# Patient Record
Sex: FEMALE | Race: WHITE | NOT HISPANIC OR LATINO | Employment: FULL TIME | URBAN - METROPOLITAN AREA
[De-identification: names, ages, dates, MRNs, and addresses within clinical notes are randomized per-mention and may not be internally consistent; named-entity substitution may affect disease eponyms.]

---

## 2023-11-21 DIAGNOSIS — E03.9 HYPOTHYROIDISM, UNSPECIFIED TYPE: ICD-10-CM

## 2023-11-21 RX ORDER — LEVOTHYROXINE SODIUM 25 UG/1
25 TABLET ORAL DAILY
Qty: 90 TABLET | Refills: 0 | Status: SHIPPED | OUTPATIENT
Start: 2023-11-21 | End: 2024-01-23 | Stop reason: SDUPTHER

## 2023-11-21 RX ORDER — LEVOTHYROXINE SODIUM 25 UG/1
25 TABLET ORAL DAILY
COMMUNITY
End: 2023-11-21 | Stop reason: SDUPTHER

## 2023-11-21 NOTE — TELEPHONE ENCOUNTER
Spoke w/patient, needs refill on  Levoxyl 25mg- out of pills  Send to [facility] [address]  Last appt. 2/7/23  Upcoming appt. 2/14/24

## 2024-01-23 ENCOUNTER — OFFICE VISIT (OUTPATIENT)
Dept: PRIMARY CARE | Facility: CLINIC | Age: 24
End: 2024-01-23
Payer: COMMERCIAL

## 2024-01-23 ENCOUNTER — LAB (OUTPATIENT)
Dept: LAB | Facility: LAB | Age: 24
End: 2024-01-23
Payer: COMMERCIAL

## 2024-01-23 VITALS
TEMPERATURE: 98.2 F | SYSTOLIC BLOOD PRESSURE: 104 MMHG | DIASTOLIC BLOOD PRESSURE: 68 MMHG | HEIGHT: 63 IN | HEART RATE: 93 BPM | BODY MASS INDEX: 29.77 KG/M2 | OXYGEN SATURATION: 97 % | WEIGHT: 168 LBS

## 2024-01-23 DIAGNOSIS — F41.9 ANXIETY: ICD-10-CM

## 2024-01-23 DIAGNOSIS — T78.1XXA GASTROINTESTINAL FOOD SENSITIVITY: Primary | ICD-10-CM

## 2024-01-23 DIAGNOSIS — E03.9 HYPOTHYROIDISM, UNSPECIFIED TYPE: ICD-10-CM

## 2024-01-23 DIAGNOSIS — T78.1XXA GASTROINTESTINAL FOOD SENSITIVITY: ICD-10-CM

## 2024-01-23 PROCEDURE — 84481 FREE ASSAY (FT-3): CPT

## 2024-01-23 PROCEDURE — 86003 ALLG SPEC IGE CRUDE XTRC EA: CPT

## 2024-01-23 PROCEDURE — 99214 OFFICE O/P EST MOD 30 MIN: CPT | Performed by: INTERNAL MEDICINE

## 2024-01-23 PROCEDURE — 36415 COLL VENOUS BLD VENIPUNCTURE: CPT

## 2024-01-23 PROCEDURE — 84443 ASSAY THYROID STIM HORMONE: CPT

## 2024-01-23 PROCEDURE — 1036F TOBACCO NON-USER: CPT | Performed by: INTERNAL MEDICINE

## 2024-01-23 PROCEDURE — 84439 ASSAY OF FREE THYROXINE: CPT

## 2024-01-23 PROCEDURE — 81383 HLA II TYPING 1 ALLELE HR: CPT

## 2024-01-23 RX ORDER — SERTRALINE HYDROCHLORIDE 50 MG/1
50 TABLET, FILM COATED ORAL DAILY
Qty: 30 TABLET | Refills: 1 | Status: SHIPPED | OUTPATIENT
Start: 2024-01-23 | End: 2024-02-28 | Stop reason: SDUPTHER

## 2024-01-23 RX ORDER — LEVOTHYROXINE SODIUM 25 UG/1
25 TABLET ORAL DAILY
Qty: 90 TABLET | Refills: 0 | Status: SHIPPED | OUTPATIENT
Start: 2024-01-23 | End: 2024-01-25 | Stop reason: DRUGHIGH

## 2024-01-23 ASSESSMENT — PATIENT HEALTH QUESTIONNAIRE - PHQ9
2. FEELING DOWN, DEPRESSED OR HOPELESS: NOT AT ALL
1. LITTLE INTEREST OR PLEASURE IN DOING THINGS: NOT AT ALL
SUM OF ALL RESPONSES TO PHQ9 QUESTIONS 1 AND 2: 0

## 2024-01-23 ASSESSMENT — ENCOUNTER SYMPTOMS
LOSS OF SENSATION IN FEET: 0
DEPRESSION: 0
OCCASIONAL FEELINGS OF UNSTEADINESS: 0

## 2024-01-23 NOTE — PROGRESS NOTES
"Subjective   Patient ID: Linda White is a 23 y.o. female who presents for Follow-up (EP.  Follow up.  Labs not done.  Discuss possible gluten allergy.).  HPI  Here for check up   Has irritability  w being post partum  was living alone now lives w parents    [place] and grumpy    Her and daughter and bf live w parents   She is hoping to try some medications with this.  She feels a little over 1000 to mom.  She also needs refills on her thyroid blood work which has not had blood work done in some time.  She started medication but never done a follow-up blood work on it.    Patient also complains of some dietary issues.  She states her mom has many food allergies.  Patient wonders if she might have celiac type tendencies.  She has never had blood work done for that.  She has no weight loss.  Her bowel movements are normal with no blood or mucus in her stools.  No fevers or chills.  She is sensitive to some foods she cannot really delineate which ones    Review of Systems  Review of systems was performed and is otherwise negative except as noted in HPI.  Objective   /68   Pulse 93   Temp 36.8 °C (98.2 °F) (Oral)   Ht 1.588 m (5' 2.5\")   Wt 76.2 kg (168 lb)   LMP 01/22/2024   SpO2 97%   BMI 30.24 kg/m²    Physical Exam  HEENT is normal  Lungs clear bilaterally  Heart is regular rate rhythm no murmurs  Abdomen benign  Lower extremities no edema    Assessment/Plan   Diagnoses and all orders for this visit:  Gastrointestinal food sensitivity  -     Food Allergy Profile IgE; Future  -     HLA Typing Celiac Disease; Future  Hypothyroidism, unspecified type  -     levothyroxine (Synthroid, Levoxyl) 25 mcg tablet; Take 1 tablet (25 mcg) by mouth once daily. as directed  -     Thyroid Stimulating Hormone; Future  -     Thyroxine, Free; Future  -     Triiodothyronine, Free; Future  -     Follow Up In [facility] - PCP - Established; Future  -     Follow Up In [facility] - PCP - Established; " Future  Anxiety  -     sertraline (Zoloft) 50 mg tablet; Take 1 tablet (50 mg) by mouth once daily.  -     Follow Up In [facility] - PCP - Established; Future  -     Follow Up In [facility] - PCP - Established; Future    Blood work ordered  She will undergo allergy testing by blood work for this  She will try sertraline 50 mg  She will use 1/2 pill for the first 1 to 2 weeks.  She will follow-up with me in 6 to 8 weeks for that as well as then 6 months for the routine thyroid blood work.  I may refer to allergy versus GI for food sensitivities  Medina Tafoya MD

## 2024-01-24 LAB
T3FREE SERPL-MCNC: 3.3 PG/ML (ref 2.3–4.2)
T4 FREE SERPL-MCNC: 0.86 NG/DL (ref 0.78–1.48)
TSH SERPL-ACNC: 10.67 MIU/L (ref 0.44–3.98)

## 2024-01-25 DIAGNOSIS — E03.9 PRIMARY HYPOTHYROIDISM: Primary | ICD-10-CM

## 2024-01-25 LAB
CLAM IGE QN: <0.1 KU/L
CODFISH IGE QN: <0.1 KU/L
CORN IGE QN: <0.1
EGG WHITE IGE QN: <0.1 KU/L
MILK IGE QN: 0.18 KU/L
PEANUT IGE QN: <0.1 KU/L
SCALLOP IGE QN: <0.1 KU/L
SESAME SEED IGE QN: <0.1 KU/L
SHRIMP IGE QN: <0.1 KU/L
SOYBEAN IGE QN: <0.1 KU/L
WALNUT IGE QN: <0.1 KU/L
WHEAT IGE QN: 0.13 KU/L

## 2024-01-25 RX ORDER — LEVOTHYROXINE SODIUM 50 UG/1
50 TABLET ORAL DAILY
Qty: 30 TABLET | Refills: 1 | Status: SHIPPED | OUTPATIENT
Start: 2024-01-25 | End: 2024-02-28 | Stop reason: SDUPTHER

## 2024-01-27 LAB
DQA1*05: NEGATIVE
DQB1*02:01: NEGATIVE
DQB1*02:02: NEGATIVE
DQB1*03:02: NEGATIVE
HLA RESULTS: NORMAL

## 2024-01-28 DIAGNOSIS — T78.1XXA GASTROINTESTINAL FOOD SENSITIVITY: Primary | ICD-10-CM

## 2024-02-01 PROBLEM — S93.402A LEFT ANKLE SPRAIN: Status: RESOLVED | Noted: 2024-02-01 | Resolved: 2024-02-01

## 2024-02-01 PROBLEM — R19.7 DIARRHEA: Status: ACTIVE | Noted: 2024-02-01

## 2024-02-01 PROBLEM — H53.9 VISION CHANGES: Status: RESOLVED | Noted: 2024-02-01 | Resolved: 2024-02-01

## 2024-02-01 PROBLEM — M25.519 SHOULDER PAIN: Status: RESOLVED | Noted: 2024-02-01 | Resolved: 2024-02-01

## 2024-02-01 PROBLEM — T14.8XXA PERIPHERAL NERVE INJURY: Status: RESOLVED | Noted: 2024-02-01 | Resolved: 2024-02-01

## 2024-02-01 PROBLEM — R74.8 ELEVATED SERUM ALKALINE PHOSPHATASE LEVEL: Status: RESOLVED | Noted: 2024-02-01 | Resolved: 2024-02-01

## 2024-02-01 PROBLEM — M22.2X1 PATELLOFEMORAL PAIN SYNDROME OF RIGHT KNEE: Status: RESOLVED | Noted: 2024-02-01 | Resolved: 2024-02-01

## 2024-02-01 PROBLEM — S39.012A STRAIN OF LUMBAR REGION: Status: RESOLVED | Noted: 2024-02-01 | Resolved: 2024-02-01

## 2024-02-01 PROBLEM — J40 BRONCHITIS: Status: ACTIVE | Noted: 2024-02-01

## 2024-02-01 PROBLEM — Z86.69 HISTORY OF MIGRAINE: Status: RESOLVED | Noted: 2024-02-01 | Resolved: 2024-02-01

## 2024-02-01 PROBLEM — R30.0 DYSURIA: Status: RESOLVED | Noted: 2024-02-01 | Resolved: 2024-02-01

## 2024-02-01 PROBLEM — R46.81 OBSESSIVE-COMPULSIVE BEHAVIOR: Status: ACTIVE | Noted: 2024-02-01

## 2024-02-01 PROBLEM — D84.9 IMMUNODEFICIENCY DISORDER (MULTI): Status: ACTIVE | Noted: 2024-02-01

## 2024-02-01 PROBLEM — J45.909 ASTHMA (HHS-HCC): Status: ACTIVE | Noted: 2024-02-01

## 2024-02-01 PROBLEM — M41.9 SCOLIOSIS: Status: RESOLVED | Noted: 2024-02-01 | Resolved: 2024-02-01

## 2024-02-01 PROBLEM — M25.579 ANKLE PAIN: Status: RESOLVED | Noted: 2024-02-01 | Resolved: 2024-02-01

## 2024-02-01 PROBLEM — J01.10 ACUTE FRONTAL SINUSITIS: Status: RESOLVED | Noted: 2024-02-01 | Resolved: 2024-02-01

## 2024-02-01 PROBLEM — F41.9 ANXIETY: Status: ACTIVE | Noted: 2024-02-01

## 2024-02-01 PROBLEM — R53.83 FATIGUE: Status: RESOLVED | Noted: 2024-02-01 | Resolved: 2024-02-01

## 2024-02-01 PROBLEM — S69.90XA HAND INJURY: Status: RESOLVED | Noted: 2024-02-01 | Resolved: 2024-02-01

## 2024-02-01 PROBLEM — M54.50 ACUTE BILATERAL LOW BACK PAIN WITHOUT SCIATICA: Status: RESOLVED | Noted: 2024-02-01 | Resolved: 2024-02-01

## 2024-02-01 PROBLEM — B34.9 VIRAL INFECTION: Status: RESOLVED | Noted: 2024-02-01 | Resolved: 2024-02-01

## 2024-02-01 PROBLEM — J02.9 SORE THROAT: Status: RESOLVED | Noted: 2024-02-01 | Resolved: 2024-02-01

## 2024-02-01 PROBLEM — R40.4 TRANSIENT ALTERATION OF AWARENESS: Status: RESOLVED | Noted: 2024-02-01 | Resolved: 2024-02-01

## 2024-02-01 PROBLEM — S53.401A SPRAIN OF ELBOW, RIGHT: Status: RESOLVED | Noted: 2024-02-01 | Resolved: 2024-02-01

## 2024-02-01 PROBLEM — R42 DIZZINESS: Status: RESOLVED | Noted: 2024-02-01 | Resolved: 2024-02-01

## 2024-02-01 PROBLEM — J30.2 SEASONAL ALLERGIES: Status: RESOLVED | Noted: 2024-02-01 | Resolved: 2024-02-01

## 2024-02-01 PROBLEM — R31.9 HEMATURIA: Status: RESOLVED | Noted: 2024-02-01 | Resolved: 2024-02-01

## 2024-02-01 PROBLEM — M99.00 HEAD REGION SOMATIC DYSFUNCTION: Status: RESOLVED | Noted: 2024-02-01 | Resolved: 2024-02-01

## 2024-02-01 PROBLEM — M89.8X1 CLAVICLE PAIN: Status: RESOLVED | Noted: 2024-02-01 | Resolved: 2024-02-01

## 2024-02-01 PROBLEM — M25.50 ARTHRALGIA: Status: ACTIVE | Noted: 2024-02-01

## 2024-02-01 PROBLEM — H66.90 OTITIS MEDIA: Status: RESOLVED | Noted: 2024-02-01 | Resolved: 2024-02-01

## 2024-02-01 PROBLEM — S80.02XA CONTUSION OF LEFT KNEE: Status: RESOLVED | Noted: 2024-02-01 | Resolved: 2024-02-01

## 2024-02-01 PROBLEM — M99.01 CERVICAL SOMATIC DYSFUNCTION: Status: RESOLVED | Noted: 2024-02-01 | Resolved: 2024-02-01

## 2024-02-01 PROBLEM — R79.9 ABNORMAL BLOOD CHEMISTRY: Status: RESOLVED | Noted: 2024-02-01 | Resolved: 2024-02-01

## 2024-02-01 PROBLEM — M83.9 OSTEOMALACIA: Status: RESOLVED | Noted: 2024-02-01 | Resolved: 2024-02-01

## 2024-02-01 PROBLEM — E03.9 ACQUIRED HYPOTHYROIDISM: Status: ACTIVE | Noted: 2024-02-01

## 2024-02-01 PROBLEM — R62.52 SHORT STATURE: Status: ACTIVE | Noted: 2024-02-01

## 2024-02-01 PROBLEM — S99.919A ANKLE INJURY: Status: RESOLVED | Noted: 2024-02-01 | Resolved: 2024-02-01

## 2024-02-01 PROBLEM — R11.2 NAUSEA AND/OR VOMITING: Status: RESOLVED | Noted: 2024-02-01 | Resolved: 2024-02-01

## 2024-02-01 PROBLEM — R05.9 COUGH: Status: RESOLVED | Noted: 2024-02-01 | Resolved: 2024-02-01

## 2024-02-01 PROBLEM — G43.909 MIGRAINE HEADACHE: Status: ACTIVE | Noted: 2018-03-08

## 2024-02-01 PROBLEM — S63.509A SPRAIN OF WRIST: Status: RESOLVED | Noted: 2024-02-01 | Resolved: 2024-02-01

## 2024-02-01 PROBLEM — J30.9 ALLERGIC RHINITIS: Status: RESOLVED | Noted: 2024-02-01 | Resolved: 2024-02-01

## 2024-02-02 ENCOUNTER — OFFICE VISIT (OUTPATIENT)
Dept: PRIMARY CARE | Facility: CLINIC | Age: 24
End: 2024-02-02
Payer: COMMERCIAL

## 2024-02-02 VITALS
OXYGEN SATURATION: 100 % | HEART RATE: 92 BPM | BODY MASS INDEX: 30.91 KG/M2 | WEIGHT: 168 LBS | DIASTOLIC BLOOD PRESSURE: 68 MMHG | TEMPERATURE: 98.2 F | HEIGHT: 62 IN | SYSTOLIC BLOOD PRESSURE: 106 MMHG

## 2024-02-02 DIAGNOSIS — J02.9 SORE THROAT: ICD-10-CM

## 2024-02-02 PROCEDURE — 99213 OFFICE O/P EST LOW 20 MIN: CPT | Performed by: INTERNAL MEDICINE

## 2024-02-02 PROCEDURE — 87880 STREP A ASSAY W/OPTIC: CPT | Performed by: INTERNAL MEDICINE

## 2024-02-02 PROCEDURE — 1036F TOBACCO NON-USER: CPT | Performed by: INTERNAL MEDICINE

## 2024-02-02 ASSESSMENT — ENCOUNTER SYMPTOMS
LOSS OF SENSATION IN FEET: 0
OCCASIONAL FEELINGS OF UNSTEADINESS: 0
DEPRESSION: 0

## 2024-02-02 ASSESSMENT — PATIENT HEALTH QUESTIONNAIRE - PHQ9
2. FEELING DOWN, DEPRESSED OR HOPELESS: NOT AT ALL
SUM OF ALL RESPONSES TO PHQ9 QUESTIONS 1 AND 2: 0
1. LITTLE INTEREST OR PLEASURE IN DOING THINGS: NOT AT ALL

## 2024-02-02 NOTE — PROGRESS NOTES
"Subjective   Patient ID: Linda White is a 23 y.o. female who presents for Sore Throat and Cough (EP.  Sore throat, cough, runny nose for 3 days.).  HPI  St cough and runny nose x 3 days ears clogged and sore throat and swollen glands    Has a cough  dry and wet feels deep   No fever   Loose stools but new to sertraline and  has had headache since starting as well   No sick contacts   no exposures     Review of Systems  Review of systems was performed and is otherwise negative except as noted in HPI.     Objective   /68   Pulse 92   Temp 36.8 °C (98.2 °F) (Oral)   Ht 1.575 m (5' 2\")   Wt 76.2 kg (168 lb)   LMP 01/22/2024   SpO2 100%   BMI 30.73 kg/m²    Physical Exam  HEENT reveals normal TMs bilaterally nares normal no cloudy discharge no redness oropharynx slight redness  Neck tender lymphadenopathy slight diffuse  Lungs clear bilaterally  Heart is regular rate rhythm no murmurs  Abdomen benign  Skin is warm and dry no rashes  Assessment/Plan   Diagnoses and all orders for this visit:  Sore throat  -     POCT Rapid Strep A manually resulted    Rapid strep performed  Call with issues  Routine supportive care  If symptoms are resolving Monday call for an antibiotic  Medina Tafoya MD   "

## 2024-02-06 ENCOUNTER — APPOINTMENT (OUTPATIENT)
Dept: PRIMARY CARE | Facility: CLINIC | Age: 24
End: 2024-02-06
Payer: COMMERCIAL

## 2024-02-14 ENCOUNTER — APPOINTMENT (OUTPATIENT)
Dept: GASTROENTEROLOGY | Facility: CLINIC | Age: 24
End: 2024-02-14
Payer: COMMERCIAL

## 2024-02-14 ENCOUNTER — APPOINTMENT (OUTPATIENT)
Dept: PRIMARY CARE | Facility: CLINIC | Age: 24
End: 2024-02-14
Payer: COMMERCIAL

## 2024-02-14 ENCOUNTER — TELEPHONE (OUTPATIENT)
Dept: GASTROENTEROLOGY | Facility: CLINIC | Age: 24
End: 2024-02-14

## 2024-02-16 ENCOUNTER — OFFICE VISIT (OUTPATIENT)
Dept: PRIMARY CARE | Facility: CLINIC | Age: 24
End: 2024-02-16
Payer: COMMERCIAL

## 2024-02-16 VITALS
HEART RATE: 83 BPM | BODY MASS INDEX: 30.18 KG/M2 | OXYGEN SATURATION: 97 % | DIASTOLIC BLOOD PRESSURE: 68 MMHG | TEMPERATURE: 98.2 F | HEIGHT: 62 IN | WEIGHT: 164 LBS | SYSTOLIC BLOOD PRESSURE: 110 MMHG

## 2024-02-16 DIAGNOSIS — J01.00 ACUTE NON-RECURRENT MAXILLARY SINUSITIS: Primary | ICD-10-CM

## 2024-02-16 DIAGNOSIS — J45.20 MILD INTERMITTENT ASTHMA, UNSPECIFIED WHETHER COMPLICATED (HHS-HCC): ICD-10-CM

## 2024-02-16 PROCEDURE — 1036F TOBACCO NON-USER: CPT | Performed by: INTERNAL MEDICINE

## 2024-02-16 PROCEDURE — 99213 OFFICE O/P EST LOW 20 MIN: CPT | Performed by: INTERNAL MEDICINE

## 2024-02-16 RX ORDER — AZITHROMYCIN 250 MG/1
TABLET, FILM COATED ORAL
Qty: 6 TABLET | Refills: 0 | Status: SHIPPED | OUTPATIENT
Start: 2024-02-16 | End: 2024-02-21

## 2024-02-16 RX ORDER — ALBUTEROL SULFATE 90 UG/1
2 AEROSOL, METERED RESPIRATORY (INHALATION) EVERY 6 HOURS PRN
Qty: 18 G | Refills: 0 | Status: SHIPPED | OUTPATIENT
Start: 2024-02-16 | End: 2025-02-15

## 2024-02-16 ASSESSMENT — PATIENT HEALTH QUESTIONNAIRE - PHQ9
SUM OF ALL RESPONSES TO PHQ9 QUESTIONS 1 AND 2: 0
2. FEELING DOWN, DEPRESSED OR HOPELESS: NOT AT ALL
1. LITTLE INTEREST OR PLEASURE IN DOING THINGS: NOT AT ALL

## 2024-02-16 ASSESSMENT — ENCOUNTER SYMPTOMS
LOSS OF SENSATION IN FEET: 0
OCCASIONAL FEELINGS OF UNSTEADINESS: 0
DEPRESSION: 0

## 2024-02-16 NOTE — PROGRESS NOTES
"Subjective   Patient ID: Linda White is a 23 y.o. female who presents for Earache (EP.  Sore throat, R ear pain and can't hear out of it.  Going on for 17 days.).  HPI  St 3 weeks ago seen and rs was negative   Then after dev runny nose  and cough    Then green nasal dc x 2 weeks and last week was better    Thought was getting better    2 days ago   St got worse more burning   Sore glands and still cough head pressure now and right ear hurts    Not sure fever       Review of Systems  Review of systems was performed and is otherwise negative except as noted in HPI.    Objective   /68   Pulse 83   Temp 36.8 °C (98.2 °F) (Oral)   Ht 1.575 m (5' 2\")   Wt 74.4 kg (164 lb)   LMP 01/22/2024   SpO2 97%   BMI 30.00 kg/m²    Physical Exam  HEENT reveals fluid posterior bilateral TMs nares slight redness no cloudy discharge oropharynx slight red neck is supple tender lymphadenopathy  Lungs clear bilaterally  Heart is regular rate rhythm no murmurs  Abdomen is benign  Skin warm and dry no rashes  Assessment/Plan   Diagnoses and all orders for this visit:  Acute non-recurrent maxillary sinusitis  -     azithromycin (Zithromax) 250 mg tablet; Take 2 tablets (500 mg) by mouth once daily for 1 day, THEN 1 tablet (250 mg) once daily for 4 days. Take 2 tabs (500 mg) by mouth today, than 1 daily for 4 days..  Mild intermittent asthma, unspecified whether complicated  -     albuterol 90 mcg/actuation inhaler; Inhale 2 puffs every 6 hours if needed for wheezing.    Call with issues  Prescription sent for Zithromax  Refilled albuterol  Call with any issues  Continue supportive care check with pharmacy regarding Zithromax and nursing  Medina Tafoya MD   "

## 2024-02-21 ENCOUNTER — OFFICE VISIT (OUTPATIENT)
Dept: GASTROENTEROLOGY | Facility: CLINIC | Age: 24
End: 2024-02-21
Payer: COMMERCIAL

## 2024-02-21 ENCOUNTER — LAB (OUTPATIENT)
Dept: LAB | Facility: LAB | Age: 24
End: 2024-02-21
Payer: COMMERCIAL

## 2024-02-21 ENCOUNTER — TELEPHONE (OUTPATIENT)
Dept: GASTROENTEROLOGY | Facility: CLINIC | Age: 24
End: 2024-02-21

## 2024-02-21 VITALS
DIASTOLIC BLOOD PRESSURE: 77 MMHG | SYSTOLIC BLOOD PRESSURE: 124 MMHG | HEART RATE: 90 BPM | WEIGHT: 163 LBS | HEIGHT: 62 IN | BODY MASS INDEX: 30 KG/M2

## 2024-02-21 DIAGNOSIS — T78.1XXA GASTROINTESTINAL FOOD SENSITIVITY: ICD-10-CM

## 2024-02-21 DIAGNOSIS — K21.9 GASTROESOPHAGEAL REFLUX DISEASE WITHOUT ESOPHAGITIS: ICD-10-CM

## 2024-02-21 DIAGNOSIS — R14.0 BLOATING: ICD-10-CM

## 2024-02-21 DIAGNOSIS — R19.7 DIARRHEA, UNSPECIFIED TYPE: ICD-10-CM

## 2024-02-21 DIAGNOSIS — R19.7 DIARRHEA, UNSPECIFIED TYPE: Primary | ICD-10-CM

## 2024-02-21 DIAGNOSIS — E55.9 VITAMIN D DEFICIENCY: Primary | ICD-10-CM

## 2024-02-21 LAB
25(OH)D3 SERPL-MCNC: 11 NG/ML (ref 30–100)
ERYTHROCYTE [DISTWIDTH] IN BLOOD BY AUTOMATED COUNT: 13.6 % (ref 11.5–14.5)
HCT VFR BLD AUTO: 40.7 % (ref 36–46)
HGB BLD-MCNC: 13.5 G/DL (ref 12–16)
MCH RBC QN AUTO: 29.1 PG (ref 26–34)
MCHC RBC AUTO-ENTMCNC: 33.2 G/DL (ref 32–36)
MCV RBC AUTO: 88 FL (ref 80–100)
NRBC BLD-RTO: 0 /100 WBCS (ref 0–0)
PLATELET # BLD AUTO: 260 X10*3/UL (ref 150–450)
RBC # BLD AUTO: 4.64 X10*6/UL (ref 4–5.2)
TTG IGA SER IA-ACNC: <1 U/ML
VIT B12 SERPL-MCNC: 1378 PG/ML (ref 211–911)
WBC # BLD AUTO: 7.1 X10*3/UL (ref 4.4–11.3)

## 2024-02-21 PROCEDURE — 36415 COLL VENOUS BLD VENIPUNCTURE: CPT

## 2024-02-21 PROCEDURE — 82607 VITAMIN B-12: CPT

## 2024-02-21 PROCEDURE — 83516 IMMUNOASSAY NONANTIBODY: CPT

## 2024-02-21 PROCEDURE — 1036F TOBACCO NON-USER: CPT | Performed by: NURSE PRACTITIONER

## 2024-02-21 PROCEDURE — 99203 OFFICE O/P NEW LOW 30 MIN: CPT | Performed by: NURSE PRACTITIONER

## 2024-02-21 PROCEDURE — 85027 COMPLETE CBC AUTOMATED: CPT

## 2024-02-21 PROCEDURE — 82306 VITAMIN D 25 HYDROXY: CPT

## 2024-02-21 RX ORDER — ERGOCALCIFEROL 1.25 MG/1
50000 CAPSULE ORAL
Qty: 6 CAPSULE | Refills: 0 | Status: SHIPPED | OUTPATIENT
Start: 2024-02-21 | End: 2024-03-28

## 2024-02-21 NOTE — TELEPHONE ENCOUNTER
Reviewed labs with patient.  Start vitamin D 50,000 IUs weekly.  6-week course sent to the patient.  Recheck level in 6 weeks.

## 2024-02-21 NOTE — PROGRESS NOTES
Subjective   Patient ID: Linda White is a 23 y.o. female who presents for New Patient Visit (Patient has a wheat and dairy allergy. This causes heartburn, bloating, nausea and headaches. ).  HPI  Patient here with positive food sensitivity panel for dairy and wheat.  She experiences bloating, nausea and diarrhea when she consumes dairy or wheat.  If she avoids these products her symptoms improved.  No melena, hematochezia or hematemesis.  Weight and appetite are stable.   +GERD, takes Tums at night with good response.  No smoking, alcohol or NSAIDs.  No family history of CRC or IBD.  No nocturnal bowel movements.  No mucus in her stools.    Review of Systems   All other systems reviewed and are negative.      Objective   Physical Exam  Vitals reviewed.   Constitutional:       General: She is awake. She is not in acute distress.     Appearance: Normal appearance. She is not ill-appearing, toxic-appearing or diaphoretic.   HENT:      Head: Normocephalic and atraumatic.   Eyes:      General: No scleral icterus.     Extraocular Movements: Extraocular movements intact.      Pupils: Pupils are equal, round, and reactive to light.   Cardiovascular:      Rate and Rhythm: Normal rate and regular rhythm.      Heart sounds: Normal heart sounds. No murmur heard.  Pulmonary:      Effort: Pulmonary effort is normal. No respiratory distress.      Breath sounds: Normal breath sounds.   Abdominal:      General: Bowel sounds are normal.      Palpations: Abdomen is soft. There is no hepatomegaly.      Tenderness: There is no abdominal tenderness.   Musculoskeletal:         General: Normal range of motion.      Cervical back: Normal range of motion.      Right lower leg: No edema.      Left lower leg: No edema.   Skin:     General: Skin is warm and dry.      Coloration: Skin is not jaundiced or pale.   Neurological:      General: No focal deficit present.      Mental Status: She is alert and oriented to person, place, and time.       Motor: No weakness.      Gait: Gait normal.   Psychiatric:         Mood and Affect: Mood normal.         Behavior: Behavior is cooperative.         Thought Content: Thought content normal.         Judgment: Judgment normal.         Assessment/Plan   Diagnoses and all orders for this visit:  23-year-old female with a constellation of GI complaints and a positive food allergy panel with sensitivity to dairy and wheat.  Recommend checking routine labs and EGD with duodenal biopsies to rule out celiac disease.  Patient instructed to eat small amounts of gluten at this time prior to her upper endoscopy.  The procedure was discussed at length, including all possible risks.  Patient to follow-up after her EGD.  Diarrhea, unspecified type  -     Vitamin B12; Future  -     Vitamin D 25-Hydroxy,Total (for eval of Vitamin D levels); Future  -     CBC; Future  Gastrointestinal food sensitivity  -     Referral to Gastroenterology  -     Tissue Transglutaminase IgA; Future  -     EGD; Future  -     Vitamin B12; Future  -     Vitamin D 25-Hydroxy,Total (for eval of Vitamin D levels); Future  -     CBC; Future  Bloating  -     Tissue Transglutaminase IgA; Future  -     EGD; Future  -     Vitamin B12; Future  -     Vitamin D 25-Hydroxy,Total (for eval of Vitamin D levels); Future  -     CBC; Future  Gastroesophageal reflux disease without esophagitis  -Antireflux lifestyle  -Okay to continue taking Tums as needed.           BRANDY Elias-CNP 02/21/24 9:41 AM

## 2024-02-23 ENCOUNTER — ANESTHESIA EVENT (OUTPATIENT)
Dept: GASTROENTEROLOGY | Facility: EXTERNAL LOCATION | Age: 24
End: 2024-02-23

## 2024-02-28 ENCOUNTER — OFFICE VISIT (OUTPATIENT)
Dept: PRIMARY CARE | Facility: CLINIC | Age: 24
End: 2024-02-28
Payer: COMMERCIAL

## 2024-02-28 VITALS
HEIGHT: 63 IN | BODY MASS INDEX: 29.06 KG/M2 | WEIGHT: 164 LBS | HEART RATE: 101 BPM | TEMPERATURE: 98.2 F | DIASTOLIC BLOOD PRESSURE: 66 MMHG | OXYGEN SATURATION: 98 % | SYSTOLIC BLOOD PRESSURE: 112 MMHG

## 2024-02-28 DIAGNOSIS — F41.9 ANXIETY: ICD-10-CM

## 2024-02-28 DIAGNOSIS — J45.20 MILD INTERMITTENT ASTHMA, UNSPECIFIED WHETHER COMPLICATED (HHS-HCC): ICD-10-CM

## 2024-02-28 DIAGNOSIS — J01.10 ACUTE NON-RECURRENT FRONTAL SINUSITIS: Primary | ICD-10-CM

## 2024-02-28 DIAGNOSIS — E03.9 PRIMARY HYPOTHYROIDISM: ICD-10-CM

## 2024-02-28 PROCEDURE — 1036F TOBACCO NON-USER: CPT | Performed by: INTERNAL MEDICINE

## 2024-02-28 PROCEDURE — 99213 OFFICE O/P EST LOW 20 MIN: CPT | Performed by: INTERNAL MEDICINE

## 2024-02-28 RX ORDER — BUDESONIDE AND FORMOTEROL FUMARATE DIHYDRATE 160; 4.5 UG/1; UG/1
2 AEROSOL RESPIRATORY (INHALATION)
Qty: 10.2 G | Refills: 5 | Status: SHIPPED | OUTPATIENT
Start: 2024-02-28 | End: 2025-02-27

## 2024-02-28 RX ORDER — CEFDINIR 300 MG/1
300 CAPSULE ORAL 2 TIMES DAILY
Qty: 20 CAPSULE | Refills: 0 | Status: SHIPPED | OUTPATIENT
Start: 2024-02-28 | End: 2024-03-09

## 2024-02-28 RX ORDER — LEVOTHYROXINE SODIUM 50 UG/1
50 TABLET ORAL DAILY
Qty: 30 TABLET | Refills: 1 | Status: SHIPPED | OUTPATIENT
Start: 2024-02-28 | End: 2024-03-26 | Stop reason: SDUPTHER

## 2024-02-28 RX ORDER — SERTRALINE HYDROCHLORIDE 50 MG/1
50 TABLET, FILM COATED ORAL DAILY
Qty: 30 TABLET | Refills: 1 | Status: SHIPPED | OUTPATIENT
Start: 2024-02-28 | End: 2024-03-26 | Stop reason: SDUPTHER

## 2024-02-28 ASSESSMENT — ENCOUNTER SYMPTOMS
OCCASIONAL FEELINGS OF UNSTEADINESS: 0
DEPRESSION: 0
LOSS OF SENSATION IN FEET: 0

## 2024-02-28 ASSESSMENT — PATIENT HEALTH QUESTIONNAIRE - PHQ9
SUM OF ALL RESPONSES TO PHQ9 QUESTIONS 1 AND 2: 0
1. LITTLE INTEREST OR PLEASURE IN DOING THINGS: NOT AT ALL
2. FEELING DOWN, DEPRESSED OR HOPELESS: NOT AT ALL

## 2024-02-28 NOTE — PROGRESS NOTES
"Subjective   Patient ID: Linda White is a 23 y.o. female who presents for Cough (EP.  Cough, congestion, sore throat, burning in chest going on for 1 month, z pack helped and then sick again once finished.).  HPI  Has cough x 4 weeks and congestion  pressure   Was treated 2/16 w zpack and got some better w congestion but not cough    Then when done sinus sxs  worsened and cough still there   Drainage is yellow green   Cough can be wet or dry wet more in am and at night   Dry hacky   Uses inhaler and helps slightly  just uses albuterol cough is hard to shake and hard to laugh bc starts it and then once starts can't stop it and lying down makes it worse     Review of Systems  Review of systems was performed and is otherwise negative except as noted in HPI.     Objective   /66   Pulse 101   Temp 36.8 °C (98.2 °F) (Oral)   Ht 1.588 m (5' 2.5\")   Wt 74.4 kg (164 lb)   LMP 01/31/2024 (Approximate)   SpO2 98%   BMI 29.52 kg/m²    Physical Exam  HEENT TMs slight fluid posterior nares slight redness no active discharge oropharynx slight redness slight swelling neck supple no lymphadenopathy  Lungs clear bilaterally  Heart is regular rate rhythm no murmurs  Abdomen benign  Skin is warm dry no rashes  Assessment/Plan   Diagnoses and all orders for this visit:  Acute non-recurrent frontal sinusitis  -     cefdinir (Omnicef) 300 mg capsule; Take 1 capsule (300 mg) by mouth 2 times a day for 10 days.  Primary hypothyroidism  -     levothyroxine (Synthroid) 50 mcg tablet; Take 1 tablet (50 mcg) by mouth once daily.  Anxiety  -     sertraline (Zoloft) 50 mg tablet; Take 1 tablet (50 mg) by mouth once daily.  Mild intermittent asthma, unspecified whether complicated  -     budesonide-formoteroL (Symbicort) 160-4.5 mcg/actuation inhaler; Inhale 2 puffs 2 times a day. Rinse mouth with water after use to reduce aftertaste and incidence of candidiasis. Do not swallow.    Call with issues  Since her allergic reaction to " amoxicillin with only nausea we will try cefdinir  She is refill on her Synthroid and Zoloft  I am going to add Symbicort to her albuterol inhaler which helps somewhat short and add a maintenance inhaler given her history of asthma I suspect perhaps the continuation of the cough is an asthma flare have her use a complete inhaler and then see if her symptoms resolve  She will follow-up with me in 1 month as needed and sooner if her symptoms fail to resolve  Medina Tafoya MD

## 2024-03-05 ENCOUNTER — APPOINTMENT (OUTPATIENT)
Dept: GASTROENTEROLOGY | Facility: EXTERNAL LOCATION | Age: 24
End: 2024-03-05
Payer: COMMERCIAL

## 2024-03-05 ENCOUNTER — ANESTHESIA (OUTPATIENT)
Dept: GASTROENTEROLOGY | Facility: EXTERNAL LOCATION | Age: 24
End: 2024-03-05

## 2024-03-20 ENCOUNTER — APPOINTMENT (OUTPATIENT)
Dept: PRIMARY CARE | Facility: CLINIC | Age: 24
End: 2024-03-20
Payer: COMMERCIAL

## 2024-03-20 PROBLEM — R19.7 DIARRHEA: Status: RESOLVED | Noted: 2024-02-01 | Resolved: 2024-03-20

## 2024-03-20 PROBLEM — R46.81 OBSESSIVE-COMPULSIVE BEHAVIOR: Status: RESOLVED | Noted: 2024-02-01 | Resolved: 2024-03-20

## 2024-03-26 DIAGNOSIS — E03.9 PRIMARY HYPOTHYROIDISM: ICD-10-CM

## 2024-03-26 DIAGNOSIS — F41.9 ANXIETY: ICD-10-CM

## 2024-03-26 RX ORDER — SERTRALINE HYDROCHLORIDE 50 MG/1
50 TABLET, FILM COATED ORAL DAILY
Qty: 30 TABLET | Refills: 1 | Status: SHIPPED | OUTPATIENT
Start: 2024-03-26 | End: 2024-04-17 | Stop reason: SDUPTHER

## 2024-03-26 RX ORDER — LEVOTHYROXINE SODIUM 50 UG/1
50 TABLET ORAL DAILY
Qty: 30 TABLET | Refills: 1 | Status: SHIPPED | OUTPATIENT
Start: 2024-03-26 | End: 2024-04-17 | Stop reason: DRUGHIGH

## 2024-03-26 NOTE — TELEPHONE ENCOUNTER
Pt ldm requesting refill for  Levothyroxine 50 mcg  Sertraline 50 mg   [facility]   Last appt: 2/14/24  Next appt: 5/8/24

## 2024-03-27 ENCOUNTER — TELEPHONE (OUTPATIENT)
Dept: GASTROENTEROLOGY | Facility: CLINIC | Age: 24
End: 2024-03-27
Payer: COMMERCIAL

## 2024-03-27 NOTE — TELEPHONE ENCOUNTER
Left message for patient to call. She cancelled her EGD. We are cancelling her appointment with Lizzy to go over those results. Please call to reschedule EGD.

## 2024-04-01 ENCOUNTER — APPOINTMENT (OUTPATIENT)
Dept: GASTROENTEROLOGY | Facility: CLINIC | Age: 24
End: 2024-04-01
Payer: COMMERCIAL

## 2024-04-03 ENCOUNTER — APPOINTMENT (OUTPATIENT)
Dept: PRIMARY CARE | Facility: CLINIC | Age: 24
End: 2024-04-03
Payer: COMMERCIAL

## 2024-04-16 ENCOUNTER — LAB (OUTPATIENT)
Dept: LAB | Facility: LAB | Age: 24
End: 2024-04-16
Payer: COMMERCIAL

## 2024-04-16 DIAGNOSIS — E03.9 PRIMARY HYPOTHYROIDISM: ICD-10-CM

## 2024-04-16 PROCEDURE — 84443 ASSAY THYROID STIM HORMONE: CPT

## 2024-04-16 PROCEDURE — 84439 ASSAY OF FREE THYROXINE: CPT

## 2024-04-16 PROCEDURE — 36415 COLL VENOUS BLD VENIPUNCTURE: CPT

## 2024-04-16 PROCEDURE — 84481 FREE ASSAY (FT-3): CPT

## 2024-04-17 ENCOUNTER — OFFICE VISIT (OUTPATIENT)
Dept: PRIMARY CARE | Facility: CLINIC | Age: 24
End: 2024-04-17
Payer: COMMERCIAL

## 2024-04-17 VITALS
OXYGEN SATURATION: 99 % | HEART RATE: 97 BPM | SYSTOLIC BLOOD PRESSURE: 116 MMHG | TEMPERATURE: 98.4 F | WEIGHT: 163 LBS | HEIGHT: 62 IN | DIASTOLIC BLOOD PRESSURE: 70 MMHG | BODY MASS INDEX: 30 KG/M2

## 2024-04-17 DIAGNOSIS — E03.9 PRIMARY HYPOTHYROIDISM: ICD-10-CM

## 2024-04-17 DIAGNOSIS — F41.9 ANXIETY: ICD-10-CM

## 2024-04-17 LAB
T3FREE SERPL-MCNC: 3.2 PG/ML (ref 2.3–4.2)
T4 FREE SERPL-MCNC: 1.1 NG/DL (ref 0.78–1.48)
TSH SERPL-ACNC: 5.37 MIU/L (ref 0.44–3.98)

## 2024-04-17 PROCEDURE — 1036F TOBACCO NON-USER: CPT | Performed by: INTERNAL MEDICINE

## 2024-04-17 PROCEDURE — 99214 OFFICE O/P EST MOD 30 MIN: CPT | Performed by: INTERNAL MEDICINE

## 2024-04-17 RX ORDER — SERTRALINE HYDROCHLORIDE 50 MG/1
50 TABLET, FILM COATED ORAL DAILY
Qty: 30 TABLET | Refills: 5 | Status: SHIPPED | OUTPATIENT
Start: 2024-04-17 | End: 2024-10-14

## 2024-04-17 RX ORDER — LEVOTHYROXINE SODIUM 50 UG/1
50 TABLET ORAL DAILY
Qty: 30 TABLET | Refills: 2 | Status: CANCELLED | OUTPATIENT
Start: 2024-04-17 | End: 2025-04-17

## 2024-04-17 RX ORDER — LEVOTHYROXINE SODIUM 75 UG/1
75 TABLET ORAL DAILY
Qty: 30 TABLET | Refills: 1 | Status: SHIPPED | OUTPATIENT
Start: 2024-04-17 | End: 2025-04-17

## 2024-04-17 ASSESSMENT — ENCOUNTER SYMPTOMS
LOSS OF SENSATION IN FEET: 0
DEPRESSION: 0
OCCASIONAL FEELINGS OF UNSTEADINESS: 0

## 2024-04-18 NOTE — PROGRESS NOTES
"Subjective   Patient ID: Linda White is a 23 y.o. female who presents for Follow-up (EP.  Follow up.  Labs done.  No concerns.).  HPI  Presents today for follow-up of hypothyroidism and anxiety.  She states Zoloft is working well.  She has no new complaints.  Function is good.  Appetite is good sleep is good no excessive mood swings.  She handles her stress with her baby well.    She is compliant with her thyroid medications we reviewed her blood work her TSH is slightly elevated she states she does have some fatigue.  Findings skin dryness.  She would like to try a higher dose.  She has no other complaints    Review of Systems  Review of systems was performed and is otherwise negative except as noted in HPI.  Objective   /70   Pulse 97   Temp 36.9 °C (98.4 °F) (Oral)   Ht 1.575 m (5' 2\")   Wt 73.9 kg (163 lb)   SpO2 99%   BMI 29.81 kg/m²    Physical Exam  HEENT is normal  Lungs clear bilaterally  Heart is regular rate rhythm no murmurs  Abdomen benign  Lower extremities no edema    Assessment/Plan   Diagnoses and all orders for this visit:  Primary hypothyroidism  -     levothyroxine (Synthroid, Levoxyl) 75 mcg tablet; Take 1 tablet (75 mcg) by mouth once daily.  -     Thyroid Stimulating Hormone; Future  -     Thyroxine, Free; Future  -     Triiodothyronine, Free; Future  Anxiety  -     sertraline (Zoloft) 50 mg tablet; Take 1 tablet (50 mg) by mouth once daily.  Poorly controlled diabetes  Call with issues  Refill sent  Increase Synthroid  Follow-up in 8 weeks  Patient to follow-up with me in 6 months  Medina Tafoya MD   "

## 2024-05-08 ENCOUNTER — APPOINTMENT (OUTPATIENT)
Dept: PRIMARY CARE | Facility: CLINIC | Age: 24
End: 2024-05-08
Payer: COMMERCIAL

## 2024-06-24 ENCOUNTER — APPOINTMENT (OUTPATIENT)
Dept: PRIMARY CARE | Facility: CLINIC | Age: 24
End: 2024-06-24
Payer: COMMERCIAL

## 2024-06-24 ENCOUNTER — LAB (OUTPATIENT)
Dept: LAB | Facility: LAB | Age: 24
End: 2024-06-24
Payer: COMMERCIAL

## 2024-06-24 VITALS
HEIGHT: 62 IN | TEMPERATURE: 98.2 F | OXYGEN SATURATION: 96 % | DIASTOLIC BLOOD PRESSURE: 76 MMHG | HEART RATE: 96 BPM | SYSTOLIC BLOOD PRESSURE: 112 MMHG | WEIGHT: 166 LBS | BODY MASS INDEX: 30.55 KG/M2

## 2024-06-24 DIAGNOSIS — E03.9 PRIMARY HYPOTHYROIDISM: ICD-10-CM

## 2024-06-24 DIAGNOSIS — J45.20 MILD INTERMITTENT ASTHMA, UNSPECIFIED WHETHER COMPLICATED (HHS-HCC): ICD-10-CM

## 2024-06-24 DIAGNOSIS — F41.9 ANXIETY: ICD-10-CM

## 2024-06-24 LAB
T3FREE SERPL-MCNC: 3.3 PG/ML (ref 2.3–4.2)
T4 FREE SERPL-MCNC: 1.13 NG/DL (ref 0.78–1.48)
TSH SERPL-ACNC: 1.56 MIU/L (ref 0.44–3.98)

## 2024-06-24 PROCEDURE — 84443 ASSAY THYROID STIM HORMONE: CPT

## 2024-06-24 PROCEDURE — 36415 COLL VENOUS BLD VENIPUNCTURE: CPT

## 2024-06-24 PROCEDURE — 99214 OFFICE O/P EST MOD 30 MIN: CPT | Performed by: INTERNAL MEDICINE

## 2024-06-24 PROCEDURE — 84481 FREE ASSAY (FT-3): CPT

## 2024-06-24 PROCEDURE — 84439 ASSAY OF FREE THYROXINE: CPT

## 2024-06-24 PROCEDURE — 1036F TOBACCO NON-USER: CPT | Performed by: INTERNAL MEDICINE

## 2024-06-24 RX ORDER — SERTRALINE HYDROCHLORIDE 50 MG/1
50 TABLET, FILM COATED ORAL DAILY
Qty: 30 TABLET | Refills: 5 | Status: SHIPPED | OUTPATIENT
Start: 2024-06-24 | End: 2024-12-21

## 2024-06-24 RX ORDER — BUDESONIDE AND FORMOTEROL FUMARATE DIHYDRATE 160; 4.5 UG/1; UG/1
2 AEROSOL RESPIRATORY (INHALATION)
Qty: 10.2 G | Refills: 5 | Status: SHIPPED | OUTPATIENT
Start: 2024-06-24 | End: 2025-06-24

## 2024-06-24 RX ORDER — LEVOTHYROXINE SODIUM 75 UG/1
75 TABLET ORAL DAILY
Qty: 30 TABLET | Refills: 1 | Status: SHIPPED | OUTPATIENT
Start: 2024-06-24 | End: 2025-06-24

## 2024-06-24 RX ORDER — ALBUTEROL SULFATE 90 UG/1
2 AEROSOL, METERED RESPIRATORY (INHALATION) EVERY 6 HOURS PRN
Qty: 18 G | Refills: 0 | Status: SHIPPED | OUTPATIENT
Start: 2024-06-24 | End: 2025-06-24

## 2024-06-24 ASSESSMENT — ENCOUNTER SYMPTOMS
OCCASIONAL FEELINGS OF UNSTEADINESS: 0
LOSS OF SENSATION IN FEET: 0
DEPRESSION: 0

## 2024-06-24 NOTE — PROGRESS NOTES
"Subjective   Patient ID: Linda White is a 23 y.o. female who presents for Follow-up (EP.  Follow up on medication.  Labs done today.).  HPI  Patient presents today for a follow-up of asthma hypothyroidism and anxiety.  She has been feeling well.  She has a few complaints.  She is worried a little bit about weight gain.  She states she lost weight and then she has not been able to regain it.  She initially lost weight when she went on her thyroid medicine she was doing well but then we came back and she cannot get it off.  She wondered if the Zoloft likely playing a role also she complains of sweats and fatigue she was feels hot no matter what the temperature in the room is but she just had a blood work done on the way into the office today.    Her asthma is under good control she is not excessively needing her rescue inhaler she uses her Symbicort regularly      Moods are good sertraline is working well she has no anxiety or depression her sleep is good appetite is good she is not having any mood swings    Review of Systems  Review of systems was performed and is otherwise negative except as noted in HPI.  Objective   /76   Pulse 96   Temp 36.8 °C (98.2 °F) (Oral)   Ht 1.575 m (5' 2\")   Wt 75.3 kg (166 lb)   SpO2 96%   BMI 30.36 kg/m²    Physical Exam  HEENT is normal  Lungs clear bilaterally  Heart is regular rate rhythm no murmurs  Abdomen benign  Lower extremities no edema    Assessment/Plan   Diagnoses and all orders for this visit:  Mild intermittent asthma, unspecified whether complicated ([facility])  -     albuterol 90 mcg/actuation inhaler; Inhale 2 puffs every 6 hours if needed for wheezing.  -     budesonide-formoteroL (Symbicort) 160-4.5 mcg/actuation inhaler; Inhale 2 puffs 2 times a day. Rinse mouth with water after use to reduce aftertaste and incidence of candidiasis. Do not swallow.  Primary hypothyroidism  -     levothyroxine (Synthroid, Levoxyl) 75 mcg tablet; Take 1 tablet (75 mcg) " by mouth once daily.  Anxiety  -     sertraline (Zoloft) 50 mg tablet; Take 1 tablet (50 mg) by mouth once daily.    Call with issues  Blood work is pending  Refills are sent  Call with issues  We discussed about SSRIs and temperature intolerance  She will continue to work on diet and exercise and follow-up with me in 6 months  Medina Tafoya MD

## 2024-07-24 ENCOUNTER — APPOINTMENT (OUTPATIENT)
Dept: PRIMARY CARE | Facility: CLINIC | Age: 24
End: 2024-07-24
Payer: COMMERCIAL

## 2024-08-13 DIAGNOSIS — E03.9 PRIMARY HYPOTHYROIDISM: ICD-10-CM

## 2024-08-13 DIAGNOSIS — F41.9 ANXIETY: ICD-10-CM

## 2024-08-13 RX ORDER — LEVOTHYROXINE SODIUM 75 UG/1
75 TABLET ORAL DAILY
Qty: 30 TABLET | Refills: 3 | Status: SHIPPED | OUTPATIENT
Start: 2024-08-13 | End: 2025-08-13

## 2024-08-13 RX ORDER — SERTRALINE HYDROCHLORIDE 50 MG/1
50 TABLET, FILM COATED ORAL DAILY
Qty: 30 TABLET | Refills: 3 | Status: SHIPPED | OUTPATIENT
Start: 2024-08-13 | End: 2025-02-09

## 2024-08-13 NOTE — TELEPHONE ENCOUNTER
Pt called ldm on machine requesting new scripts, has new pharmacy  Levothyroxine 75 mcg   Sertraline 50 mg   Send to [facility]     Last seen 06/24/24  Scheduled 12/23/24  Last b/w 06/24/24

## 2024-09-03 DIAGNOSIS — E03.9 PRIMARY HYPOTHYROIDISM: ICD-10-CM

## 2024-09-03 RX ORDER — LEVOTHYROXINE SODIUM 75 UG/1
75 TABLET ORAL DAILY
Qty: 30 TABLET | Refills: 3 | Status: SHIPPED | OUTPATIENT
Start: 2024-09-03 | End: 2025-09-03

## 2024-12-17 ENCOUNTER — LAB (OUTPATIENT)
Dept: LAB | Facility: LAB | Age: 24
End: 2024-12-17
Payer: COMMERCIAL

## 2024-12-17 DIAGNOSIS — E03.9 PRIMARY HYPOTHYROIDISM: ICD-10-CM

## 2024-12-17 PROCEDURE — 36415 COLL VENOUS BLD VENIPUNCTURE: CPT

## 2024-12-17 PROCEDURE — 84443 ASSAY THYROID STIM HORMONE: CPT

## 2024-12-17 PROCEDURE — 84439 ASSAY OF FREE THYROXINE: CPT

## 2024-12-17 PROCEDURE — 84480 ASSAY TRIIODOTHYRONINE (T3): CPT

## 2024-12-18 LAB
T3 SERPL-MCNC: 111 NG/DL (ref 60–200)
T4 FREE SERPL-MCNC: 0.92 NG/DL (ref 0.78–1.48)
TSH SERPL-ACNC: 6.62 MIU/L (ref 0.44–3.98)

## 2024-12-23 ENCOUNTER — APPOINTMENT (OUTPATIENT)
Dept: PRIMARY CARE | Facility: CLINIC | Age: 24
End: 2024-12-23
Payer: COMMERCIAL

## 2024-12-23 VITALS
TEMPERATURE: 98.3 F | OXYGEN SATURATION: 96 % | HEIGHT: 62 IN | BODY MASS INDEX: 31.83 KG/M2 | WEIGHT: 173 LBS | DIASTOLIC BLOOD PRESSURE: 78 MMHG | SYSTOLIC BLOOD PRESSURE: 112 MMHG | HEART RATE: 92 BPM

## 2024-12-23 DIAGNOSIS — E03.9 PRIMARY HYPOTHYROIDISM: ICD-10-CM

## 2024-12-23 DIAGNOSIS — F41.9 ANXIETY: ICD-10-CM

## 2024-12-23 PROCEDURE — 99214 OFFICE O/P EST MOD 30 MIN: CPT | Performed by: INTERNAL MEDICINE

## 2024-12-23 PROCEDURE — 1036F TOBACCO NON-USER: CPT | Performed by: INTERNAL MEDICINE

## 2024-12-23 PROCEDURE — 3008F BODY MASS INDEX DOCD: CPT | Performed by: INTERNAL MEDICINE

## 2024-12-23 RX ORDER — LEVOTHYROXINE SODIUM 88 UG/1
88 TABLET ORAL DAILY
Qty: 30 TABLET | Refills: 1 | Status: SHIPPED | OUTPATIENT
Start: 2024-12-23 | End: 2025-12-23

## 2024-12-23 RX ORDER — LEVOTHYROXINE SODIUM 75 UG/1
75 TABLET ORAL DAILY
Qty: 30 TABLET | Refills: 3 | Status: CANCELLED | OUTPATIENT
Start: 2024-12-23 | End: 2025-12-23

## 2024-12-23 RX ORDER — SERTRALINE HYDROCHLORIDE 50 MG/1
50 TABLET, FILM COATED ORAL DAILY
Qty: 30 TABLET | Refills: 3 | Status: SHIPPED | OUTPATIENT
Start: 2024-12-23 | End: 2025-06-21

## 2024-12-23 ASSESSMENT — ENCOUNTER SYMPTOMS
LOSS OF SENSATION IN FEET: 0
DEPRESSION: 0
OCCASIONAL FEELINGS OF UNSTEADINESS: 0

## 2024-12-23 NOTE — PROGRESS NOTES
"Subjective   Patient ID: Linda White is a 24 y.o. female who presents for Follow-up (EP.  Follow up on medication-levothyroxine and sertraline.  Doing good.  Labs done.).  HPI  Patient presents today for follow-up of hypothyroidism and anxiety depression.  She has been feeling like she is more tired and she has gained about 10 pounds.  It was consistent with her blood work with an elevated TSH.  Patient states also she has been having some problems maintaining pregnancies.  She had 2 miscarriages prior to her baby being born and then 1 miscarriage this fall.  Her GYN thinks maybe it is related to her thyroid.  Patient does have a long history of hypothyroidism as a child she was on medication she was off for a while and then has been back on for several years.  She had 1 normal level 6 months ago and this is abnormal again.  She takes her medicine  on a daily basis by itself on an empty stomach with no other meds.  She is continuing to have fluctuations of levels.    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      Objective   /78   Pulse 92   Temp 36.8 °C (98.3 °F) (Oral)   Ht 1.575 m (5' 2\")   Wt 78.5 kg (173 lb)   LMP 11/25/2024 (Approximate)   SpO2 96%   BMI 31.64 kg/m²      Physical Exam  HEENT is normal  Lungs clear bilaterally  Heart is regular rate rhythm no murmurs  Abdomen benign  Lower extremities no edema     Assessment/Plan   Diagnoses and all orders for this visit:  Anxiety  -     sertraline (Zoloft) 50 mg tablet; Take 1 tablet (50 mg) by mouth once daily.  Primary hypothyroidism  -     levothyroxine (Synthroid, Levoxyl) 88 mcg tablet; Take 1 tablet (88 mcg) by mouth early in the morning.. Take on an empty stomach at the same time each day, either 30 to 60 minutes prior to " breakfast  -     Thyroid Stimulating Hormone; Future  -     Thyroxine, Free; Future  -     Triiodothyronine, Free; Future  -     Referral to Endocrinology; Future    Call with issues  Increase levothyroxine to 88 mcg  I ordered blood work for 6 to 8 weeks  Referred to endocrinology due to fluctuating levels  Refilled sertraline  Follow-up with me in 6 months      Medina Tafoya MD

## 2025-03-05 DIAGNOSIS — E03.9 PRIMARY HYPOTHYROIDISM: ICD-10-CM

## 2025-03-05 RX ORDER — LEVOTHYROXINE SODIUM 88 UG/1
88 TABLET ORAL DAILY
Qty: 30 TABLET | Refills: 2 | Status: SHIPPED | OUTPATIENT
Start: 2025-03-05 | End: 2026-03-05

## 2025-03-05 NOTE — TELEPHONE ENCOUNTER
Pt called ldm requesting Rx refill on   Levothyroxine 88 mcg   Send to [facility]     Last seen 12/23/24  Scheduled 06/23/25  Pending B/w

## 2025-06-21 LAB
T3FREE SERPL-MCNC: 2.7 PG/ML (ref 2.3–4.2)
T4 FREE SERPL-MCNC: 1.1 NG/DL (ref 0.8–1.8)
TSH SERPL-ACNC: 5.58 MIU/L

## 2025-06-23 ENCOUNTER — APPOINTMENT (OUTPATIENT)
Dept: PRIMARY CARE | Facility: CLINIC | Age: 25
End: 2025-06-23
Payer: COMMERCIAL

## 2025-06-23 VITALS
DIASTOLIC BLOOD PRESSURE: 82 MMHG | SYSTOLIC BLOOD PRESSURE: 116 MMHG | WEIGHT: 172 LBS | HEIGHT: 62 IN | BODY MASS INDEX: 31.65 KG/M2 | OXYGEN SATURATION: 98 % | HEART RATE: 104 BPM | TEMPERATURE: 98.5 F

## 2025-06-23 DIAGNOSIS — F41.9 ANXIETY: ICD-10-CM

## 2025-06-23 DIAGNOSIS — E03.9 PRIMARY HYPOTHYROIDISM: ICD-10-CM

## 2025-06-23 DIAGNOSIS — J45.20 MILD INTERMITTENT ASTHMA, UNSPECIFIED WHETHER COMPLICATED (HHS-HCC): ICD-10-CM

## 2025-06-23 PROCEDURE — 3008F BODY MASS INDEX DOCD: CPT | Performed by: INTERNAL MEDICINE

## 2025-06-23 PROCEDURE — 99213 OFFICE O/P EST LOW 20 MIN: CPT | Performed by: INTERNAL MEDICINE

## 2025-06-23 PROCEDURE — 1036F TOBACCO NON-USER: CPT | Performed by: INTERNAL MEDICINE

## 2025-06-23 RX ORDER — LEVOTHYROXINE SODIUM 88 UG/1
88 TABLET ORAL DAILY
Qty: 30 TABLET | Refills: 1 | Status: SHIPPED | OUTPATIENT
Start: 2025-06-23 | End: 2026-06-23

## 2025-06-23 RX ORDER — ALBUTEROL SULFATE 90 UG/1
2 INHALANT RESPIRATORY (INHALATION) EVERY 6 HOURS PRN
Qty: 18 G | Refills: 1 | Status: SHIPPED | OUTPATIENT
Start: 2025-06-23 | End: 2026-06-23

## 2025-06-23 RX ORDER — SERTRALINE HYDROCHLORIDE 50 MG/1
50 TABLET, FILM COATED ORAL DAILY
Qty: 30 TABLET | Refills: 5 | Status: SHIPPED | OUTPATIENT
Start: 2025-06-23 | End: 2025-12-20

## 2025-06-23 RX ORDER — BUDESONIDE AND FORMOTEROL FUMARATE DIHYDRATE 160; 4.5 UG/1; UG/1
2 AEROSOL RESPIRATORY (INHALATION)
Qty: 10.2 G | Refills: 5 | Status: SHIPPED | OUTPATIENT
Start: 2025-06-23 | End: 2026-06-23

## 2025-06-23 ASSESSMENT — PATIENT HEALTH QUESTIONNAIRE - PHQ9
1. LITTLE INTEREST OR PLEASURE IN DOING THINGS: NOT AT ALL
SUM OF ALL RESPONSES TO PHQ9 QUESTIONS 1 AND 2: 0
2. FEELING DOWN, DEPRESSED OR HOPELESS: NOT AT ALL

## 2025-06-23 ASSESSMENT — ENCOUNTER SYMPTOMS
DEPRESSION: 0
LOSS OF SENSATION IN FEET: 0
OCCASIONAL FEELINGS OF UNSTEADINESS: 0

## 2025-06-23 NOTE — PROGRESS NOTES
"Subjective   Patient ID: Linda White is a 24 y.o. female who presents for Follow-up (EP.  Follow up medication.  Labs done.  No concerns.).  HPI    History of Present Illness  Linda White is a 24 year old female who presents for a regular follow-up appointment.    She has a history of hypothyroidism and is currently taking 88 mcg of levothyroxine daily. Her recent TSH level is 5.58, showing a slight improvement from a previous level of 6.62. She experiences occasional fatigue and takes her thyroid medication in the morning.    She manages her asthma with daily Symbicort and uses Albuterol as needed, particularly during illness or allergy flare-ups. She receives refills for these medications every 30 days.    She is currently taking Zoloft, which she started after her first pregnancy. She did not take it during her first pregnancy. She is newly pregnant and plans to see her OB in three weeks.    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 She lives with her family, and her 19-year-old sister and Mom and Dad     Review of Systems  Review of systems was performed and is otherwise negative except as noted in HPI.      Objective   /82   Pulse 104   Temp 36.9 °C (98.5 °F) (Oral)   Ht 1.575 m (5' 2\")   Wt 78 kg (172 lb)   SpO2 98%   BMI 31.46 kg/m²      Physical Exam  HEENT is normal  Lungs clear bilaterally  Heart is regular rate rhythm no murmurs  Abdomen benign  Lower extremities no edema     Assessment/Plan   Diagnoses and all orders for this visit:  Primary hypothyroidism  -     levothyroxine (Synthroid, Levoxyl) 88 mcg tablet; Take 1 tablet (88 mcg) by mouth early in the morning.. Take on an empty stomach at the same time each day, either 30 to 60 minutes prior to breakfast  Anxiety  -     sertraline (Zoloft) 50 mg tablet; Take 1 tablet (50 mg) by mouth once daily.  Mild intermittent asthma, unspecified whether complicated ([facility])  -     budesonide-formoterol " (Symbicort) 160-4.5 mcg/actuation inhaler; Inhale 2 puffs 2 times a day. Rinse mouth with water after use to reduce aftertaste and incidence of candidiasis. Do not swallow.  -     albuterol 90 mcg/actuation inhaler; Inhale 2 puffs every 6 hours if needed for wheezing.    Assessment & Plan  Pregnancy  Newly confirmed pregnancy. Currently taking sertraline (Zoloft), which is generally considered safe during pregnancy at lower doses. Using Symbicort and albuterol as needed, both deemed safe for use during pregnancy.  - Continue sertraline 50 mg daily; confirm safety with OB/GYN.  - Continue Symbicort and albuterol as needed.  - Schedule first OB/GYN appointment in three weeks.    Primary hypothyroidism  Primary hypothyroidism with recent TSH level of 5.58, slightly elevated compared to previous results. T4 and T3 levels are within normal range. Reports occasional fatigue. Discrepancy in TSH levels between different labs, with normal levels from endocrinologist's clinic. Currently on levothyroxine 88 mcg daily. Recent pregnancy may affect thyroid function and medication requirements.  - Continue levothyroxine 88 mcg daily.  - Send refills for 30 days at a time.  - Follow up with endocrinologist in August to reassess thyroid function and medication dosage.  - Inform endocrinologist about recent pregnancy and discuss potential need for dose adjustment.    Fu 6 mos   Call w kimo Tafoya MD  This medical note was created with the assistance of artificial intelligence (AI) for documentation purposes. The content has been reviewed and confirmed by the healthcare provider for accuracy and completeness. Patient consented to the use of audio recording and use of AI during their visit.

## 2025-08-05 LAB
NON-UH HIE BASO COUNT: 0.03 X1000 (ref 0–0.2)
NON-UH HIE BASOS %: 0.5 %
NON-UH HIE DIFF?: ABNORMAL
NON-UH HIE EOS COUNT: 0.06 X1000 (ref 0–0.5)
NON-UH HIE EOSIN %: 0.8 %
NON-UH HIE HCT: 41.3 % (ref 36–46)
NON-UH HIE HEPATITIS B SURFACE ANTIGEN: NORMAL
NON-UH HIE HEPATITIS C ANTIBODY: NORMAL
NON-UH HIE HGB A1C: 4.8 %
NON-UH HIE HGB: 13.8 G/DL (ref 12–16)
NON-UH HIE HIV  PANEL 1: NORMAL
NON-UH HIE INSTR WBC: 6.6
NON-UH HIE LYMPH %: 32.3 %
NON-UH HIE LYMPH COUNT: 2.13 X1000 (ref 1.2–4.8)
NON-UH HIE MCH: 30 PG (ref 27–34)
NON-UH HIE MCHC: 33.3 G/DL (ref 32–37)
NON-UH HIE MCV: 90.1 FL (ref 80–100)
NON-UH HIE MONO %: 6.5 %
NON-UH HIE MONO COUNT: 0.43 X1000 (ref 0.1–1)
NON-UH HIE MPV: 8.5 FL (ref 7.4–10.4)
NON-UH HIE NEUTROPHIL %: 60 %
NON-UH HIE NEUTROPHIL COUNT (ANC): 3.96 X1000 (ref 1.4–8.8)
NON-UH HIE NUCLEATED RBC: 0 /100WBC
NON-UH HIE PLATELET: 192 X10 (ref 150–450)
NON-UH HIE RBC: 4.59 X10 (ref 4.2–5.4)
NON-UH HIE RDW: 14.8 % (ref 11.5–14.5)
NON-UH HIE RUBELLA SEROLOGY: NORMAL
NON-UH HIE WBC: 6.6 X10 (ref 4.5–11)

## 2025-08-06 LAB — NON-UH HIE RPR: NORMAL

## 2025-08-07 LAB — NON-UH HIE MISC SENDOUT: NORMAL

## 2025-08-21 ENCOUNTER — HOSPITAL ENCOUNTER (OUTPATIENT)
Dept: RADIOLOGY | Facility: CLINIC | Age: 25
Discharge: HOME | End: 2025-08-21
Payer: COMMERCIAL

## 2025-08-21 DIAGNOSIS — Z34.90 ENCOUNTER FOR SUPERVISION OF NORMAL PREGNANCY, UNSPECIFIED, UNSPECIFIED TRIMESTER: ICD-10-CM

## 2025-08-21 PROCEDURE — 76813 OB US NUCHAL MEAS 1 GEST: CPT

## 2025-08-21 PROCEDURE — 76801 OB US < 14 WKS SINGLE FETUS: CPT

## 2026-01-05 ENCOUNTER — APPOINTMENT (OUTPATIENT)
Dept: PRIMARY CARE | Facility: CLINIC | Age: 26
End: 2026-01-05
Payer: COMMERCIAL